# Patient Record
Sex: MALE | Race: WHITE | Employment: FULL TIME | URBAN - METROPOLITAN AREA
[De-identification: names, ages, dates, MRNs, and addresses within clinical notes are randomized per-mention and may not be internally consistent; named-entity substitution may affect disease eponyms.]

---

## 2020-03-06 ENCOUNTER — OFFICE VISIT (OUTPATIENT)
Dept: FAMILY MEDICINE | Facility: CLINIC | Age: 50
End: 2020-03-06
Payer: COMMERCIAL

## 2020-03-06 VITALS
SYSTOLIC BLOOD PRESSURE: 134 MMHG | OXYGEN SATURATION: 97 % | WEIGHT: 216.4 LBS | HEIGHT: 68 IN | DIASTOLIC BLOOD PRESSURE: 78 MMHG | TEMPERATURE: 97.9 F | HEART RATE: 91 BPM | RESPIRATION RATE: 18 BRPM | BODY MASS INDEX: 32.8 KG/M2

## 2020-03-06 DIAGNOSIS — E78.5 HYPERLIPIDEMIA LDL GOAL <130: ICD-10-CM

## 2020-03-06 DIAGNOSIS — J30.2 SEASONAL ALLERGIES: Primary | ICD-10-CM

## 2020-03-06 DIAGNOSIS — J30.9 ALLERGIC RHINITIS, UNSPECIFIED SEASONALITY, UNSPECIFIED TRIGGER: ICD-10-CM

## 2020-03-06 LAB
ANION GAP SERPL CALCULATED.3IONS-SCNC: 9 MMOL/L (ref 3–14)
BUN SERPL-MCNC: 14 MG/DL (ref 7–30)
CALCIUM SERPL-MCNC: 9.4 MG/DL (ref 8.5–10.1)
CHLORIDE SERPL-SCNC: 105 MMOL/L (ref 94–109)
CHOLEST SERPL-MCNC: 248 MG/DL
CO2 SERPL-SCNC: 27 MMOL/L (ref 20–32)
CREAT SERPL-MCNC: 0.89 MG/DL (ref 0.66–1.25)
GFR SERPL CREATININE-BSD FRML MDRD: >90 ML/MIN/{1.73_M2}
GLUCOSE SERPL-MCNC: 122 MG/DL (ref 70–99)
HDLC SERPL-MCNC: 29 MG/DL
LDLC SERPL CALC-MCNC: ABNORMAL MG/DL
NONHDLC SERPL-MCNC: 219 MG/DL
POTASSIUM SERPL-SCNC: 4.4 MMOL/L (ref 3.4–5.3)
SODIUM SERPL-SCNC: 141 MMOL/L (ref 133–144)
TRIGL SERPL-MCNC: 512 MG/DL

## 2020-03-06 PROCEDURE — 80061 LIPID PANEL: CPT | Performed by: FAMILY MEDICINE

## 2020-03-06 PROCEDURE — 36415 COLL VENOUS BLD VENIPUNCTURE: CPT | Performed by: FAMILY MEDICINE

## 2020-03-06 PROCEDURE — 80048 BASIC METABOLIC PNL TOTAL CA: CPT | Performed by: FAMILY MEDICINE

## 2020-03-06 PROCEDURE — 99203 OFFICE O/P NEW LOW 30 MIN: CPT | Performed by: FAMILY MEDICINE

## 2020-03-06 ASSESSMENT — PAIN SCALES - GENERAL: PAINLEVEL: NO PAIN (0)

## 2020-03-06 ASSESSMENT — MIFFLIN-ST. JEOR: SCORE: 1826.08

## 2020-03-06 NOTE — LETTER
March 11, 2020      Mian Pina  [address] [place] [address]        Dear ,    We are writing to inform you of your test results.    Your triglyceride  levels are very high the recommended nonpharmacologic interventions such as weight loss in obese patients, aerobic exercise, avoidance of concentrated sugars and medications that raise serum triglyceride levels, and strict glycemic control is the first-line therapy.. Alcohol use must be avoided with severe hypertriglyceridemia as it can cause large increases in triglyceride levels precipitate pancreatitis. We can do a medication the lowers the triglycerides.   The total cholesterol level is also above desirable cholesterol levels; the 10-year ASCVD risk score (Billingsleyolya SORENSEN Jr., et al., 2013) is: 8.8%  (that is your risk of heart disease in the next 10 years; interpretation: < 5% is low risk, 5% - < 7.5% borderline risk, >7.5% - < 20% intermediate risk and > 20% high risk and the risk estimates need for cholesterol medication). The recommended interventions include healthy diet, regular exercise, maintaining an ideal weight, consider a cholesterol lowering pill and rechecking in 3-6 months. Your blood sugar is in the prediabetes range (100 - 125) and the same interventions will help.     Let me know your thoughts or schedule appointment in 1 month to discuss        Resulted Orders   Lipid Profile (Chol, Trig, HDL, LDL calc)   Result Value Ref Range    Cholesterol 248 (H) <200 mg/dL      Comment:      Desirable:       <200 mg/dl    Triglycerides 512 (H) <150 mg/dL      Comment:      Borderline high:  150-199 mg/dl  High:             200-499 mg/dl  Very high:       >499 mg/dl  Fasting specimen      HDL Cholesterol 29 (L) >39 mg/dL    LDL Cholesterol Calculated  <100 mg/dL     Cannot estimate LDL when triglyceride exceeds 400 mg/dL    Non HDL Cholesterol 219 (H) <130 mg/dL      Comment:      Above Desirable:  130-159 mg/dl  Borderline high:  160-189  mg/dl  High:             190-219 mg/dl  Very high:       >219 mg/dl     Basic metabolic panel  (Ca, Cl, CO2, Creat, Gluc, K, Na, BUN)   Result Value Ref Range    Sodium 141 133 - 144 mmol/L    Potassium 4.4 3.4 - 5.3 mmol/L    Chloride 105 94 - 109 mmol/L    Carbon Dioxide 27 20 - 32 mmol/L    Anion Gap 9 3 - 14 mmol/L    Glucose 122 (H) 70 - 99 mg/dL      Comment:      Fasting specimen    Urea Nitrogen 14 7 - 30 mg/dL    Creatinine 0.89 0.66 - 1.25 mg/dL    GFR Estimate >90 >60 mL/min/[1.73_m2]      Comment:      Non  GFR Calc  Starting 12/18/2018, serum creatinine based estimated GFR (eGFR) will be   calculated using the Chronic Kidney Disease Epidemiology Collaboration   (CKD-EPI) equation.      GFR Estimate If Black >90 >60 mL/min/[1.73_m2]      Comment:       GFR Calc  Starting 12/18/2018, serum creatinine based estimated GFR (eGFR) will be   calculated using the Chronic Kidney Disease Epidemiology Collaboration   (CKD-EPI) equation.      Calcium 9.4 8.5 - 10.1 mg/dL       If you have any questions or concerns, please call the clinic at the number listed above.       Sincerely,        Milo Brower MD/dieter

## 2020-03-06 NOTE — PROGRESS NOTES
"Subjective     Mian Pina is a 49 year old male who presents to clinic today for the following health issues:    HPI   Chief Complaint   Patient presents with     Establish Care     Hypertension     hasn't been diagnosed     Elevated BP concerns;  Had high readings twice when was sick; getting reading in clinic good today    Lingering sinus cold   Had influenza 2 weeks ago   Every spring gets this congestion and would like to see allergy. Associated with SOB, wheezing, low energy.  No personal or family history of asthma  Has taken decongestant in past and helped    Shots:   Never had flu shot    Reviewed and updated as needed this visit by Provider    Review of Systems   Constitutional, HEENT, cardiovascular, pulmonary, gi and gu systems are negative, except as otherwise noted.      Objective    /78   Pulse 91   Temp 97.9  F (36.6  C) (Oral)   Resp 18   Ht 1.727 m (5' 8\")   Wt 98.2 kg (216 lb 6.4 oz)   SpO2 97%   BMI 32.90 kg/m    Body mass index is 32.9 kg/m .  Physical Exam   GENERAL: healthy, alert and no distress  RESP: lungs clear to auscultation - no rales, rhonchi or wheezes  CV: regular rate and rhythm, normal S1 S2, no S3 or S4, no murmur, click or rub, no peripheral edema  MS: no gross musculoskeletal defects noted, no edema  NEURO: Normal strength and tone, mentation intact and speech normal    Diagnostic Test Results:  Results for orders placed or performed in visit on 03/06/20   Lipid Profile (Chol, Trig, HDL, LDL calc)     Status: Abnormal   Result Value Ref Range    Cholesterol 248 (H) <200 mg/dL    Triglycerides 512 (H) <150 mg/dL    HDL Cholesterol 29 (L) >39 mg/dL    LDL Cholesterol Calculated  <100 mg/dL     Cannot estimate LDL when triglyceride exceeds 400 mg/dL    Non HDL Cholesterol 219 (H) <130 mg/dL   Basic metabolic panel  (Ca, Cl, CO2, Creat, Gluc, K, Na, BUN)     Status: Abnormal   Result Value Ref Range    Sodium 141 133 - 144 mmol/L    Potassium 4.4 3.4 - 5.3 mmol/L    " Chloride 105 94 - 109 mmol/L    Carbon Dioxide 27 20 - 32 mmol/L    Anion Gap 9 3 - 14 mmol/L    Glucose 122 (H) 70 - 99 mg/dL    Urea Nitrogen 14 7 - 30 mg/dL    Creatinine 0.89 0.66 - 1.25 mg/dL    GFR Estimate >90 >60 mL/min/[1.73_m2]    GFR Estimate If Black >90 >60 mL/min/[1.73_m2]    Calcium 9.4 8.5 - 10.1 mg/dL     Assessment & Plan     Mian was seen today for establish care and hypertension.    Diagnoses and all orders for this visit:    Seasonal allergies  -     ALLERGY/ASTHMA ADULT REFERRAL    Allergic rhinitis, unspecified seasonality, unspecified trigger  -     ALLERGY/ASTHMA ADULT REFERRAL    Hyperlipidemia LDL goal <130     Shows hypertriglyceridemia; recommended nonpharmacologic interventions such as weight loss in obese patients, aerobic exercise, avoidance of concentrated sugars and medications that raise serum triglyceride levels, and strict glycemic control is the first-line therapy.. Alcohol use must be avoided with severe hypertriglyceridemia as it can cause large increases in triglyceride levels precipitate pancreatitis. We can do a medication the lowers the triglycerides, to decide.   -     Lipid Profile (Chol, Trig, HDL, LDL calc)  -     Basic metabolic panel  (Ca, Cl, CO2, Creat, Gluc, K, Na, BUN)    Return in about 3 months (around 6/6/2020) for Physical Exam.    Milo Brower MD  [facility]

## 2020-03-10 ENCOUNTER — OFFICE VISIT (OUTPATIENT)
Dept: ALLERGY | Facility: CLINIC | Age: 50
End: 2020-03-10
Attending: FAMILY MEDICINE
Payer: COMMERCIAL

## 2020-03-10 VITALS
HEART RATE: 104 BPM | WEIGHT: 217.4 LBS | SYSTOLIC BLOOD PRESSURE: 145 MMHG | OXYGEN SATURATION: 95 % | DIASTOLIC BLOOD PRESSURE: 94 MMHG | BODY MASS INDEX: 33.06 KG/M2

## 2020-03-10 DIAGNOSIS — J30.81 ALLERGIC RHINITIS DUE TO ANIMALS: ICD-10-CM

## 2020-03-10 DIAGNOSIS — J30.89 ALLERGIC RHINITIS DUE TO DUST MITE: ICD-10-CM

## 2020-03-10 DIAGNOSIS — R06.02 SOB (SHORTNESS OF BREATH): Primary | ICD-10-CM

## 2020-03-10 PROBLEM — E78.1 HYPERTRIGLYCERIDEMIA: Status: ACTIVE | Noted: 2020-03-10

## 2020-03-10 LAB
FEF 25/75: NORMAL
FEV-1: NORMAL
FEV1/FVC: NORMAL
FVC: NORMAL

## 2020-03-10 PROCEDURE — 94010 BREATHING CAPACITY TEST: CPT | Performed by: ALLERGY & IMMUNOLOGY

## 2020-03-10 PROCEDURE — 99243 OFF/OP CNSLTJ NEW/EST LOW 30: CPT | Mod: 25 | Performed by: ALLERGY & IMMUNOLOGY

## 2020-03-10 PROCEDURE — 95004 PERQ TESTS W/ALRGNC XTRCS: CPT | Performed by: ALLERGY & IMMUNOLOGY

## 2020-03-10 NOTE — PATIENT INSTRUCTIONS
If you have any questions regarding your allergies, asthma, or what we discussed during your visit today please call the allergy clinic or contact us via [employer].    [facility] Allergy RN Line: [phone]  [facility] Scheduling Line: [phone]  [facility] Scheduling Line: [phone]      ENVIRONMENTAL PERCUTANEOUS SKIN TESTING: ADULT  [facility] 3/10/2020   Consent Y   Ordering Physician  Dr. Bajwa   Interpreting Physician Dr. Bajwa   Testing Technician Karuna ZUÑIGA RN   Location Back   Time start:  2:40 PM   Time End:  2:55 PM   Positive Control: Histatrol*ALK 1 mg/ml 6/24   Negative Control: 50% Glycerin 0   Cat Hair*ALK (10,000 BAU/ml) 0   AP Dog Hair/Dander (1:100 w/v) 4/17   Dust Mite p. 30,000 AU/ml 11/32   Dust Mite f. (30,000 AU/ml) 15/30   Davie (W/F in millimeters) 0   Cj Grass (100,000 BAU/mL) 0   Red Cedar (W/F in millimeters) 0   Maple/[place] (W/F in millimeters) 0   Hackberry (W/F in millimeters) 0   [place] (W/F in millimeters) 0   [place] *ALK (W/F in millimeters) 0   American Elm (W/F in millimeters) 0   [place] (W/F in millimeters) 0   Black [place] (W/F in millimeters) 0   Birch Mix (W/F in millimeters) 0   [place] (W/F in millimeters) 0   Oak (W/F in millimeters) 0   Cocklebur (W/F in millimeters) 0   [place] (W/F in millimeters) 0   White Lloyd (W/F in millimeters) 0   Careless (W/F in millimeters) 0   Nettle (W/F in millimeters) 0   English Plantain (W/F in millimeters) 0   Kochia (W/F in millimeters) 0   Lamb's Quarter (W/F in millimeters) 0   Marshelder (W/F in millimeters) 0   Ragweed Mix* ALK (W/F in millimeters) 0   Russian Thistle (W/F in millimeters) 0   Sagebrush/Mugwort (W/F in millimeters) 0   Sheep Sorrel (W/F in millimeters) 0   Feather Mix* ALK (W/F in millimeters) 0   Penicillium Mix (1:10 w/v) 0   Curvularia spicifera (1:10 w/v) 0   Epicoccum (1:10 w/v) 0   Aspergillus fumigatus (1:10 w/v): 0   Alternaria tenius (1:10  w/v) 0   H. Cladosporium (1:10 w/v) 0   Phoma herbarum (1:10 w/v) 0        Patient Education     Controlling Allergens: Dust Mites     Wash all bedding in hot water.     Constant exposure to allergens means constant allergy symptoms. That s why controlling or avoiding the allergens that cause your symptoms is an important part of your treatment. If you are allergic to dust mites, the tips below can help to lessen your exposure to dust mites.  Dust mite allergy  Dust mites are a common cause of nasal allergies. These mites are tiny organisms that live in bedding, upholstered furniture, and carpet. They live in warm, humid conditions. House-dust mites are almost impossible to get rid of. But you can keep them under control.  Make changes to your home  Some furniture, like sofas and chairs, hold dust mites. To lessen the problem:    Choose nonfabric upholstery, like leather or vinyl.    Replace horizontal blinds with pull-down shades or vertical blinds.    Use washable curtains instead of heavy drapes.    Have as little carpeting as possible.    Cover your mattress, box spring, and pillows in allergy-proof casings.  Housecleaning  Here are some tips:    Wash sheets, blankets, and mattress pads every 1 to 2 weeks in hot water (at least 130 F).    Remove stuffed animals and other things that collect dust, such as wall hangings, knickknacks, and books--especially in the bedroom.    Dust your home every week with a damp cloth. Vacuum once a week. Use HEPA (high efficiency particulate air) filters or double-ply bags in the vacuum . Or, use a vacuum designed to lessen allergens.    If someone else can t dust and vacuum for you, wearing a filter mask may help.  Reduce indoor humidity  Dust mites need moist air to live. Use a dehumidifier to reduce air moisture. Don t use humidifiers, or vaporizers.  Talk with your healthcare provider about other ways to reduce dust in your home. Ask about medicines that can help with  your allergy symptoms.  Date Last Reviewed: 9/1/2016 2000-2019 [employer]. [address] [place] [address]. All rights reserved. This information is not intended as a substitute for professional medical care. Always follow your healthcare professional's instructions.           Patient Education     Controlling Allergens: Dust Mites in the Bedroom    Many people with asthma are allergic to dust mites. Dust mites are tiny bugs that live in warm, damp places. They are too small to see. But they live in mattresses, pillows, upholstered furniture, and house dust. Dust mite allergy can cause asthma flare-ups. If you have this allergy, there are many steps you can take to control dust mites at home.  Take these steps to control dust mites in your bed and bedroom:  1. Keep all clothing in a closet, with the door shut.  2. Make sure the room is not too humid. It should be below 50% humidity.  3. Choose wood, leather, or vinyl for furniture instead of upholstery.  4. Wash all bedding, pillows, and stuffed toys in hot water (130 F) every week. Dry them in a hot dryer. Remove any items that can't be washed.  5. Use special covers on pillows, mattresses, and box springs. These covers are made for people with allergies.  6. If you can, replace carpeting with tile or hardwood mary. Use washable throw rugs. Or don't use rugs at all.  7. Dust furniture with a damp cloth at least once a week.  8. Use an air conditioner or a dehumidifier to reduce humidity and filter the air. Clean the filter often.  9. Use pull-down shades or vertical window blinds that can be easily cleaned. Use these instead of curtains or drapes.  10. Use filters over heater vents.  Date Last Reviewed: 10/1/2016    4096-0922 [employer]. [address] [place], [place] [id number]. All rights reserved. This information is not intended as a substitute for professional medical care. Always follow your healthcare  professional's instructions.

## 2020-03-10 NOTE — PROGRESS NOTES
Dear Milo Brower MD,    Thank you for referring your patient Mian Pina to the Allergy/Immunology Clinic. Mian Pina was seen in the Allergy Clinic at [facility]. The following are my recommendations regarding his Shortness of Breath, Allergic Rhinitis Due to Animals and Allergic Rhinitis Due to Dust Mites    1. Counseling provided regarding allergen avoidance measures to dust mite  2. Recommend second generation H1 antihistamine such as cetirizine, fexofenadine, or loratadine daily as needed  3. Consider addition of nasal steroid for persistent symptoms  4. Follow-up in 3 months      Mian Pina is a 49 year old White male being seen today at the request of Dr. Brower in consultation for allergies. He states that he has had allergies and sinus issues for many years. These symptoms are present throughout the year though they do worsen in the spring and summer months. He has never been tested or evaluated for symptoms. Mian reports symptoms of rhinorrhea, nasal congestion, and post-nasal drainage. These symptoms are constant throughout the day. He does not have significant ocular symptoms. He has occasionally taken antihistamines but does not typically take medications. He has not used a neti pot or sinus irrigation rinse.    Mian was diagnosed with influenza A 2 weeks ago. He regularly goes to the gym several times per week. He has had some wheezing and shortness of breath since this illness. He does feel that he is improving.      PAST MEDICAL HISTORY:  None    FAMILY HISTORY:  No known family history of allergies or asthma    History reviewed. No pertinent surgical history.    ENVIRONMENTAL HISTORY: [place] The home is heated with a forced air and gas furnace. They do have central air conditioning. The patient's bedroom is furnished with hard mary in bedroom and fabric window coverings.  Pets inside the house include 1  cat(s). There is no history of cockroach or mice infestation. There is/are 0 smokers in the house.  The house does not have a damp basement.     SOCIAL HISTORY:   Mian is employed as . He has missed 3 days of school/work due to influenza. He lives with his 2 children (half time).      REVIEW OF SYSTEMS:  General: negative for weight gain. negative for weight loss. negative for changes in sleep.   Eyes: negative for itching. negative for redness. negative for tearing/watering. negative for vision changes  Ears: negative for fullness. negative for hearing loss. negative for dizziness.   Nose: negative for snoring.negative for changes in smell. positive  for drainage.   Throat: negative for hoarseness. negative for sore throat. negative for trouble swallowing.   Lungs: positive  for cough. positive  for shortness of breath.negative for wheezing. positive  for sputum production.   Cardiovascular: negative for chest pain. negative for swelling of ankles. negative for fast or irregular heartbeat.   Gastrointestinal: negative for nausea. negative for heartburn. negative for acid reflux.   Musculoskeletal: negative for joint pain. negative for joint stiffness. negative for joint swelling.   Neurologic: negative for seizures. negative for fainting. negative for weakness.   Psychiatric: negative for changes in mood. negative for anxiety.   Endocrine: negative for cold intolerance. negative for heat intolerance. negative for tremors.   Hematologic: negative for easy bruising. negative for easy bleeding.  Integumentary: negative for rash. negative for scaling. negative for nail changes.     No current outpatient medications on file.    There is no immunization history on file for this patient.  No Known Allergies      EXAM:   BP (!) 145/94 (BP Location: Left arm, Patient Position: Sitting, Cuff Size: Adult Large)   Pulse 104   Wt 98.6 kg (217 lb 6.4 oz)   SpO2 95%   BMI 33.06 kg/m    GENERAL APPEARANCE:  alert, cooperative and not in distress  SKIN: no rashes, no lesions  HEAD: atraumatic, normocephalic  EYES: lids and lashes normal, conjunctivae and sclerae clear, pupils equal, round, reactive to light, EOM full and intact  ENT: no scars or lesions, nasal exam showed no discharge, swelling or lesions noted, otoscopy showed external auditory canals clear, tympanic membranes normal, tongue midline and normal, soft palate, uvula, and tonsils normal  NECK: no asymmetry, masses, or scars, supple without significant adenopathy  LUNGS: unlabored respirations, no intercostal retractions or accessory muscle use, clear to auscultation without rales or wheezes  HEART: regular rate and rhythm without murmurs and normal S1 and S2  MUSCULOSKELETAL: no musculoskeletal defects are noted  NEURO: no focal deficits noted  PSYCH: does not appear depressed or anxious    WORKUP: Skin testing, Spirometry  SPIROMETRY       FVC 4.41L (92% of predicted).     FEV1 3.65L (98% of predicted).     FEV1/FVC 83%      I have reviewed and interpreted these results. These values and flow volume loop are consistent with normal lung function.    ENVIRONMENTAL PERCUTANEOUS SKIN TESTING: ADULT  [facility] 3/10/2020   Consent Y   Ordering Physician  Dr. Bajwa   Interpreting Physician Dr. Bajwa   Testing Technician Karuna ZUÑIGA RN   Location Back   Time start:  2:40 PM   Time End:  2:55 PM   Positive Control: Histatrol*ALK 1 mg/ml 6/24   Negative Control: 50% Glycerin 0   Cat Hair*ALK (10,000 BAU/ml) 0   AP Dog Hair/Dander (1:100 w/v) 4/17   Dust Mite p. 30,000 AU/ml 11/32   Dust Mite f. (30,000 AU/ml) 15/30   Davie (W/F in millimeters) 0   Cj Grass (100,000 BAU/mL) 0   Red Cedar (W/F in millimeters) 0   Maple/[place] (W/F in millimeters) 0   Hackberry (W/F in millimeters) 0   [place] (W/F in millimeters) 0   [place] *ALK (W/F in millimeters) 0   American Elm (W/F in millimeters) 0   [place] (W/F in millimeters) 0   Black [place]  (W/F in millimeters) 0   Birch Mix (W/F in millimeters) 0   [place] (W/F in millimeters) 0   Oak (W/F in millimeters) 0   Cockle[place] (W/F in millimeters) 0   [place] (W/F in millimeters) 0   White [place] (W/F in millimeters) 0   Careless (W/F in millimeters) 0   Nettle (W/F in millimeters) 0   English Plantain (W/F in millimeters) 0   Kochia (W/F in millimeters) 0   Lamb's Quarter (W/F in millimeters) 0   Marshelder (W/F in millimeters) 0   Ragweed Mix* ALK (W/F in millimeters) 0   Russian Thistle (W/F in millimeters) 0   Sagebrush/Mugwort (W/F in millimeters) 0   Sheep Sorrel (W/F in millimeters) 0   Feather Mix* ALK (W/F in millimeters) 0   Penicillium Mix (1:10 w/v) 0   Curvularia spicifera (1:10 w/v) 0   Epicoccum (1:10 w/v) 0   Aspergillus fumigatus (1:10 w/v): 0   Alternaria tenius (1:10 w/v) 0   H. Cladosporium (1:10 w/v) 0   Phoma herbarum (1:10 w/v) 0      Appropriate response to controls, positive to dog and dust mite    ASSESSMENT/PLAN:  Mian Pina is a 49 year old male here for evaluation of allergies. Skin prick testing was positive for sensitization to dog and dust mite. He was counseled regarding allergen avoidance measures, medications, and immunotherapy treatment to manage allergic rhinitis. He reported symptoms of shortness of breath and wheezing that he attributes to recent infection with influenza A. Thee symptoms are improving and spirometry is normal.    1. Counseling provided regarding allergen avoidance measures to dust mite  2. Recommend second generation H1 antihistamine such as cetirizine, fexofenadine, or loratadine daily as needed  3. Consider addition of nasal steroid for persistent symptoms  4. Follow-up in 3 months      Thank you for allowing me to participate in the care of Mian Pina.      Letty Bajwa MD  Allergy/Immunology  [facility]      Chart documentation done in part with Dragon Voice Recognition Software. Although reviewed after completion,  some word and grammatical errors may remain.

## 2020-03-10 NOTE — NURSING NOTE
Per provider verbal order, placed Adult Environmental Panel scratch test.  Consent was obtained prior to procedure.  Once panels were placed, patient was monitored for 15 minutes in clinic.  Provider read test after 15 minutes..  Pt tolerated procedure well.  All questions and concerns were addressed at office visit.       Karuna Lu RN

## 2020-03-10 NOTE — LETTER
3/10/2020         RE: Mian Pina  [address] [place] [address]        Dear Colleague,    Thank you for referring your patient, Mian Pina, to the [facility]. Please see a copy of my visit note below.    Dear Milo Brower MD,    Thank you for referring your patient Mian Pina to the Allergy/Immunology Clinic. Mian Pina was seen in the Allergy Clinic at [facility]. The following are my recommendations regarding his Shortness of Breath, Allergic Rhinitis Due to Animals and Allergic Rhinitis Due to Dust Mites    1. Counseling provided regarding allergen avoidance measures to dust mite  2. Recommend second generation H1 antihistamine such as cetirizine, fexofenadine, or loratadine daily as needed  3. Consider addition of nasal steroid for persistent symptoms  4. Follow-up in 3 months      Mian Pina is a 49 year old White male being seen today at the request of Dr. Brower in consultation for allergies. He states that he has had allergies and sinus issues for many years. These symptoms are present throughout the year though they do worsen in the spring and summer months. He has never been tested or evaluated for symptoms. Mian reports symptoms of rhinorrhea, nasal congestion, and post-nasal drainage. These symptoms are constant throughout the day. He does not have significant ocular symptoms. He has occasionally taken antihistamines but does not typically take medications. He has not used a neti pot or sinus irrigation rinse.    Mian was diagnosed with influenza A 2 weeks ago. He regularly goes to the gym several times per week. He has had some wheezing and shortness of breath since this illness. He does feel that he is improving.      PAST MEDICAL HISTORY:  None    FAMILY HISTORY:  No known family history of allergies or asthma    History reviewed. No pertinent surgical history.    ENVIRONMENTAL HISTORY: [place] The home is heated with a forced air and gas furnace. They do have central air conditioning. The patient's bedroom is furnished with hard mary in bedroom and fabric window coverings.  Pets inside the house include 1 cat(s). There is no history of cockroach or mice infestation. There is/are 0 smokers in the house.  The house does not have a damp basement.     SOCIAL HISTORY:   Mian is employed as . He has missed 3 days of school/work due to influenza. He lives with his 2 children (half time).      REVIEW OF SYSTEMS:  General: negative for weight gain. negative for weight loss. negative for changes in sleep.   Eyes: negative for itching. negative for redness. negative for tearing/watering. negative for vision changes  Ears: negative for fullness. negative for hearing loss. negative for dizziness.   Nose: negative for snoring.negative for changes in smell. positive  for drainage.   Throat: negative for hoarseness. negative for sore throat. negative for trouble swallowing.   Lungs: positive  for cough. positive  for shortness of breath.negative for wheezing. positive  for sputum production.   Cardiovascular: negative for chest pain. negative for swelling of ankles. negative for fast or irregular heartbeat.   Gastrointestinal: negative for nausea. negative for heartburn. negative for acid reflux.   Musculoskeletal: negative for joint pain. negative for joint stiffness. negative for joint swelling.   Neurologic: negative for seizures. negative for fainting. negative for weakness.   Psychiatric: negative for changes in mood. negative for anxiety.   Endocrine: negative for cold intolerance. negative for heat intolerance. negative for tremors.   Hematologic: negative for easy bruising. negative for easy bleeding.  Integumentary: negative for rash. negative for scaling. negative for nail changes.     No current outpatient medications on file.    There is no immunization history on file  for this patient.  No Known Allergies      EXAM:   BP (!) 145/94 (BP Location: Left arm, Patient Position: Sitting, Cuff Size: Adult Large)   Pulse 104   Wt 98.6 kg (217 lb 6.4 oz)   SpO2 95%   BMI 33.06 kg/m    GENERAL APPEARANCE: alert, cooperative and not in distress  SKIN: no rashes, no lesions  HEAD: atraumatic, normocephalic  EYES: lids and lashes normal, conjunctivae and sclerae clear, pupils equal, round, reactive to light, EOM full and intact  ENT: no scars or lesions, nasal exam showed no discharge, swelling or lesions noted, otoscopy showed external auditory canals clear, tympanic membranes normal, tongue midline and normal, soft palate, uvula, and tonsils normal  NECK: no asymmetry, masses, or scars, supple without significant adenopathy  LUNGS: unlabored respirations, no intercostal retractions or accessory muscle use, clear to auscultation without rales or wheezes  HEART: regular rate and rhythm without murmurs and normal S1 and S2  MUSCULOSKELETAL: no musculoskeletal defects are noted  NEURO: no focal deficits noted  PSYCH: does not appear depressed or anxious    WORKUP: Skin testing, Spirometry  SPIROMETRY       FVC 4.41L (92% of predicted).     FEV1 3.65L (98% of predicted).     FEV1/FVC 83%      I have reviewed and interpreted these results. These values and flow volume loop are consistent with normal lung function.    ENVIRONMENTAL PERCUTANEOUS SKIN TESTING: ADULT  [facility] 3/10/2020   Consent Y   Ordering Physician  Dr. Bajwa   Interpreting Physician Dr. Bajwa   Testing Technician Karuna ZUÑIGA RN   Location Back   Time start:  2:40 PM   Time End:  2:55 PM   Positive Control: Histatrol*ALK 1 mg/ml 6/24   Negative Control: 50% Glycerin 0   Cat Hair*ALK (10,000 BAU/ml) 0   AP Dog Hair/Dander (1:100 w/v) 4/17   Dust Mite p. 30,000 AU/ml 11/32   Dust Mite f. (30,000 AU/ml) 15/30   Davie (W/F in millimeters) 0   Cj Grass (100,000 BAU/mL) 0   Red Cedar (W/F in millimeters) 0    Maple/Burlington (W/F in millimeters) 0   Hackberry (W/F in millimeters) 0   Flagler (W/F in millimeters) 0   Arapahoe *ALK (W/F in millimeters) 0   American Elm (W/F in millimeters) 0   [place]ck (W/F in millimeters) 0   Black [place] (W/F in millimeters) 0   Birch Mix (W/F in millimeters) 0   Loman (W/F in millimeters) 0   Oak (W/F in millimeters) 0   Cocklebur (W/F in millimeters) 0   Bartow (W/F in millimeters) 0   White Lloyd (W/F in millimeters) 0   Careless (W/F in millimeters) 0   Nettle (W/F in millimeters) 0   English Plantain (W/F in millimeters) 0   Kochia (W/F in millimeters) 0   Lamb's Quarter (W/F in millimeters) 0   Marshelder (W/F in millimeters) 0   Ragweed Mix* ALK (W/F in millimeters) 0   Russian Thistle (W/F in millimeters) 0   Sagebrush/Mugwort (W/F in millimeters) 0   Sheep Sorrel (W/F in millimeters) 0   Feather Mix* ALK (W/F in millimeters) 0   Penicillium Mix (1:10 w/v) 0   Curvularia spicifera (1:10 w/v) 0   Epicoccum (1:10 w/v) 0   Aspergillus fumigatus (1:10 w/v): 0   Alternaria tenius (1:10 w/v) 0   H. Cladosporium (1:10 w/v) 0   Phoma herbarum (1:10 w/v) 0      Appropriate response to controls, positive to dog and dust mite    ASSESSMENT/PLAN:  Mian Pina is a 49 year old male here for evaluation of allergies. Skin prick testing was positive for sensitization to dog and dust mite. He was counseled regarding allergen avoidance measures, medications, and immunotherapy treatment to manage allergic rhinitis. He reported symptoms of shortness of breath and wheezing that he attributes to recent infection with influenza A. Thee symptoms are improving and spirometry is normal.    1. Counseling provided regarding allergen avoidance measures to dust mite  2. Recommend second generation H1 antihistamine such as cetirizine, fexofenadine, or loratadine daily as needed  3. Consider addition of nasal steroid for persistent symptoms  4. Follow-up in 3 months      Thank you for allowing me to  participate in the care of Mian Pina.      Letty Bajwa MD  Allergy/Immunology  [facility]      Chart documentation done in part with Dragon Voice Recognition Software. Although reviewed after completion, some word and grammatical errors may remain.    Again, thank you for allowing me to participate in the care of your patient.        Sincerely,        Letty Bajwa MD

## 2020-10-14 ENCOUNTER — CARE COORDINATION (OUTPATIENT)
Dept: CARDIOLOGY | Facility: CLINIC | Age: 50
End: 2020-10-14

## 2020-10-14 NOTE — PROGRESS NOTES
Referral to see Dr. Stark in his interventional clinic.  Pt was called and a message was left for him, including the telephone number.

## 2020-11-06 ENCOUNTER — TELEPHONE (OUTPATIENT)
Dept: CARDIOLOGY | Facility: CLINIC | Age: 50
End: 2020-11-06

## 2020-11-12 ENCOUNTER — OFFICE VISIT (OUTPATIENT)
Dept: CARDIOLOGY | Facility: CLINIC | Age: 50
End: 2020-11-12
Attending: INTERNAL MEDICINE
Payer: COMMERCIAL

## 2020-11-12 VITALS
BODY MASS INDEX: 33.03 KG/M2 | DIASTOLIC BLOOD PRESSURE: 96 MMHG | SYSTOLIC BLOOD PRESSURE: 159 MMHG | HEART RATE: 90 BPM | OXYGEN SATURATION: 97 % | WEIGHT: 223 LBS | HEIGHT: 69 IN

## 2020-11-12 DIAGNOSIS — I10 BENIGN ESSENTIAL HYPERTENSION: Primary | ICD-10-CM

## 2020-11-12 DIAGNOSIS — E78.5 DYSLIPIDEMIA: ICD-10-CM

## 2020-11-12 DIAGNOSIS — E78.1 HYPERTRIGLYCERIDEMIA: ICD-10-CM

## 2020-11-12 DIAGNOSIS — R73.03 PREDIABETES: ICD-10-CM

## 2020-11-12 DIAGNOSIS — Z82.49 FAMILY HISTORY OF ISCHEMIC HEART DISEASE: ICD-10-CM

## 2020-11-12 DIAGNOSIS — R06.09 EXERTIONAL DYSPNEA: ICD-10-CM

## 2020-11-12 LAB — INTERPRETATION ECG - MUSE: NORMAL

## 2020-11-12 PROCEDURE — G0463 HOSPITAL OUTPT CLINIC VISIT: HCPCS | Mod: 25

## 2020-11-12 PROCEDURE — 93005 ELECTROCARDIOGRAM TRACING: CPT

## 2020-11-12 PROCEDURE — 99205 OFFICE O/P NEW HI 60 MIN: CPT

## 2020-11-12 RX ORDER — ATORVASTATIN CALCIUM 40 MG/1
40 TABLET, FILM COATED ORAL DAILY
Qty: 90 TABLET | Refills: 3 | Status: SHIPPED | OUTPATIENT
Start: 2020-11-12

## 2020-11-12 RX ORDER — LISINOPRIL 10 MG/1
10 TABLET ORAL DAILY
Qty: 90 TABLET | Refills: 3 | Status: SHIPPED | OUTPATIENT
Start: 2020-11-12

## 2020-11-12 ASSESSMENT — PAIN SCALES - GENERAL: PAINLEVEL: NO PAIN (0)

## 2020-11-12 ASSESSMENT — MIFFLIN-ST. JEOR: SCORE: 1866.9

## 2020-11-12 NOTE — LETTER
11/12/2020      RE: Mian Pina  [address] [place] [address]       Dear Colleague,    Thank you for the opportunity to participate in the care of your patient, Mian Pina, at the [facility] [place] [facility]. Please see a copy of my visit note below.    Cardiology Clinic Note  November 12, 2020      HPI:  Mian Pina is a 50 year old who presents to establish preventive cardiology care. Last spring he presented to his PCP for routine check-up and was diagnosed with dyslipidemia, hypertension and prediabetes. Lifestyle modifications were recommended at that time. He has no known history of coronary artery disease. He reports a family history of heart disease on his father's side, but is unsure of the details. He is a nonsmoker.      Patient tells me that he feels well overall. 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 He is on his feet most of the day. He walks on the treadmill for 10-15 minutes day and occasionally jogs. He feels like he gets short of breath easier than he used to but thinks this may be related to his weight and being out of shape. He denies any exertional chest pain or pressure. He occasionally feels an odd tingling sensation in his chest and arms when his blood pressure is high - he says he wouldn't describe it as pain. This has been happening more frequently due to the stress of selling his house, raising kids, and he is concerned about his health. He admits that he drinks more than he should - two glasses of wine a night and eats poorly. He would like to cut down on alcohol intake and is interested in meeting with a dietician. He otherwise denies orthopnea, PND, leg swelling, palpitations, lightheadedness or syncope.     Past medical history:  HTN  HLD  Prediabetes  Family history of CAD    Medications:  No medications    Allergies:    No Known Allergies    Family and social history:    Family history of  heart disease on father's side (unsure of details)  Mom parkinsons    Social History     Socioeconomic History     Marital status: Single     Spouse name: Not on file     Number of children: Not on file     Years of education: Not on file     Highest education level: Not on file   Occupational History     Not on file   Social Needs     Financial resource strain: Not on file     Food insecurity     Worry: Not on file     Inability: Not on file     Transportation needs     Medical: Not on file     Non-medical: Not on file   Tobacco Use     Smoking status: Never Smoker     Smokeless tobacco: Never Used   Substance and Sexual Activity     Alcohol use: Yes     Drug use: Never     Sexual activity: Not Currently   Lifestyle     Physical activity     Days per week: Not on file     Minutes per session: Not on file     Stress: Not on file   Relationships     Social connections     Talks on phone: Not on file     Gets together: Not on file     Attends Cheondoism service: Not on file     Active member of club or organization: Not on file     Attends meetings of clubs or organizations: Not on file     Relationship status: Not on file     Intimate partner violence     Fear of current or ex partner: Not on file     Emotionally abused: Not on file     Physically abused: Not on file     Forced sexual activity: Not on file   Other Topics Concern     Not on file   Social History Narrative     Not on file     Review of Systems:  Skin: No skin rash or ulcers.  Respiratory: No cough or hemoptysis.  Cardiovascular: See HPI.    Gastrointestinal: No abdominal pain, nausea, vomiting, hematemesis or melena.  Genitourinary: No increased frequency or urgency of urine. No dysuria or hematuria.  Musculoskeletal: No polyarthralgia or myalgias.  Neurologic: No headaches, seizure or focal weakness.  Hematologic/Lymphatic/Immunologic: No bleeding tendency.    Vital signs:  BP (!) 159/96 (BP Location: Right arm, Patient Position: Sitting, Cuff Size:  "Adult Regular)   Pulse 90   Ht 1.753 m (5' 9\")   Wt 101.2 kg (223 lb)   SpO2 97%   BMI 32.93 kg/m     Wt Readings from Last 2 Encounters:   11/12/20 101.2 kg (223 lb)   03/10/20 98.6 kg (217 lb 6.4 oz)     Physical Exam:  Gen: NAD.    HEENT: No conjunctival pallor or scleral icterus, MMM. Clear oropharynx.    Neck: No JVD. No thyroid enlargement or cervical adenopathy.    Chest: Clear to auscultation bilaterally.    CV: Normal first and second heart sounds. No murmurs or gallop appreciated.    Abdomen: Soft, non-tender, non-distended, BS+.  Ext: No edema. Warm and well perfused with normal capillary refill.    Skin: No skin rash or ulcers.  Neuro: alert, oriented and appropriately conversant.    Psych: Normal affect and speech.    Labs:  Last Basic Metabolic Panel:  Lab Results   Component Value Date     03/06/2020      Lab Results   Component Value Date    POTASSIUM 4.4 03/06/2020     Lab Results   Component Value Date    CHLORIDE 105 03/06/2020     Lab Results   Component Value Date    ALESSANDRO 9.4 03/06/2020     Lab Results   Component Value Date    CO2 27 03/06/2020     Lab Results   Component Value Date    BUN 14 03/06/2020     Lab Results   Component Value Date    CR 0.89 03/06/2020     Lab Results   Component Value Date     03/06/2020       Recent Labs   Lab Test 03/06/20  1015   CHOL 248*   HDL 29*   LDL Cannot estimate LDL when triglyceride exceeds 400 mg/dL   TRIG 512*     Diagnostics:    EKG today: NSR HR 94, incomplete RBBB, LVH    Assessment and Plan:  Mian Pina is a 50 year old with family history of heart disease and recently diagnosed dyslipidemia, hypertension and prediabetes who presents to establish preventive cardiology care.     1. Hyperlipidemia and hypertriglyceridemia: Recent lipid profile shows total cholesterol 248 and triglycerides > 500, unable to calculate LDL. Plan to obtain direct LDL. Start lipitor 40 mg daily with repeat lipids and hepatic panel in 3 months. If " trigylcerides remain elevated at that time will start lovaza. Dicussed importance of heart healthy diet, daily exercise and reducing alcohol intake.     2. Hypertension: Hypertensive today (159/96) and at a clinic visit in March 2020. Recommend starting lisinopril 10 mg daily with repeat labs in 1 week. Advised patient to start checking home blood pressures and call if consistently > 130/80.    3. Prediabetes: Fasting  in March 2020. Obtain HgbA1C with labs next week, if elevated will refer to endocrinology. Placed dietician referral.     4. ASCVD risk score: 10 year Afton risk score is 10%. Start ASA 81 mg daily. Statin as above. Plan for coronary CTA to evaluate for coronary artery disease.    5. Abnormal ECG: Noted to have LVH and incomplete RBBB on ECG today. Plan for resting echocardiogram.    Follow-up: RTC in 3 months w/labs prior to visit.        Please do not hesitate to contact me if you have any questions/concerns.     Sincerely,     [facility]

## 2020-11-12 NOTE — NURSING NOTE
Chief Complaint   Patient presents with     New Patient     Family hx Cardiac Disease        Vitals were taken and medications were reconciled.     Juliet Jordan  12:42 PM

## 2020-11-12 NOTE — PATIENT INSTRUCTIONS
You were seen today in the Cardiovascular Clinic at the [facility].    Cardiology provider you saw during your visit Alexia Mccartney NP and Dr. Stark.      1. Start Aspirin 81 mg daily, atorvastatin 40 mg and lisinopril 10 mg daily.  2. Have fasting labs drawn in 1 week - BMP, HgbA1C and direct LDL.  3. Schedule Echocardiogram and Coronary CTA at your convenience.  4. Meet with dietician - referral has been placed.  5. Start checking home blood pressures - call if consistently > 130/80.   6. Follow-up with me or Dr. Stark in 3 months with repeat fasting lipids prior to visit.     Questions and [phone].   First press #1 for the University and then press #3 for Medical Questions to reach the Cardiology triage nurse.     On Call Cardiologist for after hours or on weekends: [phone], press option #4 and ask to speak to the on-call Cardiologist.

## 2020-11-12 NOTE — PROGRESS NOTES
Cardiology Clinic Note  November 12, 2020      HPI:  Mian Pina is a 50 year old who presents to establish preventive cardiology care. Last spring he presented to his PCP for routine check-up and was diagnosed with dyslipidemia, hypertension and prediabetes. Lifestyle modifications were recommended at that time. He has no known history of coronary artery disease. He reports a family history of heart disease on his father's side, but is unsure of the details. He is a nonsmoker.      Patient tells me that he feels well overall. 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 He is on his feet most of the day. He walks on the treadmill for 10-15 minutes day and occasionally jogs. He feels like he gets short of breath easier than he used to but thinks this may be related to his weight and being out of shape. He denies any exertional chest pain or pressure. He occasionally feels an odd tingling sensation in his chest and arms when his blood pressure is high - he says he wouldn't describe it as pain. This has been happening more frequently due to the stress of selling his house, raising kids, and he is concerned about his health. He admits that he drinks more than he should - two glasses of wine a night and eats poorly. He would like to cut down on alcohol intake and is interested in meeting with a dietician. He otherwise denies orthopnea, PND, leg swelling, palpitations, lightheadedness or syncope.     Past medical history:  HTN  HLD  Prediabetes  Family history of CAD    Medications:  No medications    Allergies:    No Known Allergies    Family and social history:    Family history of heart disease on father's side (unsure of details)  Mom parkinsons    Social History     Socioeconomic History     Marital status: Single     Spouse name: Not on file     Number of children: Not on file     Years of education: Not on file     Highest education level: Not on file   Occupational History     Not on file   Social Needs      "Financial resource strain: Not on file     Food insecurity     Worry: Not on file     Inability: Not on file     Transportation needs     Medical: Not on file     Non-medical: Not on file   Tobacco Use     Smoking status: Never Smoker     Smokeless tobacco: Never Used   Substance and Sexual Activity     Alcohol use: Yes     Drug use: Never     Sexual activity: Not Currently   Lifestyle     Physical activity     Days per week: Not on file     Minutes per session: Not on file     Stress: Not on file   Relationships     Social connections     Talks on phone: Not on file     Gets together: Not on file     Attends Sabianist service: Not on file     Active member of club or organization: Not on file     Attends meetings of clubs or organizations: Not on file     Relationship status: Not on file     Intimate partner violence     Fear of current or ex partner: Not on file     Emotionally abused: Not on file     Physically abused: Not on file     Forced sexual activity: Not on file   Other Topics Concern     Not on file   Social History Narrative     Not on file     Review of Systems:  Skin: No skin rash or ulcers.  Respiratory: No cough or hemoptysis.  Cardiovascular: See HPI.    Gastrointestinal: No abdominal pain, nausea, vomiting, hematemesis or melena.  Genitourinary: No increased frequency or urgency of urine. No dysuria or hematuria.  Musculoskeletal: No polyarthralgia or myalgias.  Neurologic: No headaches, seizure or focal weakness.  Hematologic/Lymphatic/Immunologic: No bleeding tendency.    Vital signs:  BP (!) 159/96 (BP Location: Right arm, Patient Position: Sitting, Cuff Size: Adult Regular)   Pulse 90   Ht 1.753 m (5' 9\")   Wt 101.2 kg (223 lb)   SpO2 97%   BMI 32.93 kg/m     Wt Readings from Last 2 Encounters:   11/12/20 101.2 kg (223 lb)   03/10/20 98.6 kg (217 lb 6.4 oz)     Physical Exam:  Gen: NAD.    HEENT: No conjunctival pallor or scleral icterus, MMM. Clear oropharynx.    Neck: No JVD. No thyroid " enlargement or cervical adenopathy.    Chest: Clear to auscultation bilaterally.    CV: Normal first and second heart sounds. No murmurs or gallop appreciated.    Abdomen: Soft, non-tender, non-distended, BS+.  Ext: No edema. Warm and well perfused with normal capillary refill.    Skin: No skin rash or ulcers.  Neuro: alert, oriented and appropriately conversant.    Psych: Normal affect and speech.    Labs:  Last Basic Metabolic Panel:  Lab Results   Component Value Date     03/06/2020      Lab Results   Component Value Date    POTASSIUM 4.4 03/06/2020     Lab Results   Component Value Date    CHLORIDE 105 03/06/2020     Lab Results   Component Value Date    ALESSANDRO 9.4 03/06/2020     Lab Results   Component Value Date    CO2 27 03/06/2020     Lab Results   Component Value Date    BUN 14 03/06/2020     Lab Results   Component Value Date    CR 0.89 03/06/2020     Lab Results   Component Value Date     03/06/2020       Recent Labs   Lab Test 03/06/20  1015   CHOL 248*   HDL 29*   LDL Cannot estimate LDL when triglyceride exceeds 400 mg/dL   TRIG 512*     Diagnostics:    EKG today: NSR HR 94, incomplete RBBB, LVH    Assessment and Plan:  Mian Pina is a 50 year old with family history of heart disease and recently diagnosed dyslipidemia, hypertension and prediabetes who presents to establish preventive cardiology care.     1. Hyperlipidemia and hypertriglyceridemia: Recent lipid profile shows total cholesterol 248 and triglycerides > 500, unable to calculate LDL. Plan to obtain direct LDL. Start lipitor 40 mg daily with repeat lipids and hepatic panel in 3 months. If trigylcerides remain elevated at that time will start lovaza. Dicussed importance of heart healthy diet, daily exercise and reducing alcohol intake.     2. Hypertension: Hypertensive today (159/96) and at a clinic visit in March 2020. Recommend starting lisinopril 10 mg daily with repeat labs in 1 week. Advised patient to start checking home  blood pressures and call if consistently > 130/80.    3. Prediabetes: Fasting  in March 2020. Obtain HgbA1C with labs next week, if elevated will refer to endocrinology. Placed dietician referral.     4. ASCVD risk score: 10 year Burney risk score is 10%. Start ASA 81 mg daily. Statin as above. Plan for coronary CTA to evaluate for coronary artery disease.    5. Abnormal ECG: Noted to have LVH and incomplete RBBB on ECG today. Plan for resting echocardiogram.    Follow-up: RTC in 3 months w/labs prior to visit.    Physician Attestation   I, Alexsander Stark, saw and evaluated Mian Pina as part of a shared visit.  I have reviewed and discussed with the advanced practice provider their history, physical and plan.    I personally reviewed the vital signs, medications, labs and imaging.    My key history or physical exam findings & Key management decisions made by me:   Start Lisinopril and Lipitor      Alexsander Stark

## 2020-11-19 ENCOUNTER — VIRTUAL VISIT (OUTPATIENT)
Dept: NUTRITION | Facility: CLINIC | Age: 50
End: 2020-11-19
Payer: COMMERCIAL

## 2020-11-19 DIAGNOSIS — I10 BENIGN ESSENTIAL HYPERTENSION: ICD-10-CM

## 2020-11-19 DIAGNOSIS — R73.03 PREDIABETES: ICD-10-CM

## 2020-11-19 DIAGNOSIS — E78.5 DYSLIPIDEMIA: ICD-10-CM

## 2020-11-19 DIAGNOSIS — R73.03 PREDIABETES: Primary | ICD-10-CM

## 2020-11-19 LAB
ALBUMIN SERPL-MCNC: 4 G/DL (ref 3.4–5)
ALP SERPL-CCNC: 74 U/L (ref 40–150)
ALT SERPL W P-5'-P-CCNC: 44 U/L (ref 0–70)
ANION GAP SERPL CALCULATED.3IONS-SCNC: 4 MMOL/L (ref 3–14)
AST SERPL W P-5'-P-CCNC: 30 U/L (ref 0–45)
BILIRUB SERPL-MCNC: 0.8 MG/DL (ref 0.2–1.3)
BUN SERPL-MCNC: 22 MG/DL (ref 7–30)
CALCIUM SERPL-MCNC: 9 MG/DL (ref 8.5–10.1)
CHLORIDE SERPL-SCNC: 104 MMOL/L (ref 94–109)
CO2 SERPL-SCNC: 28 MMOL/L (ref 20–32)
CREAT SERPL-MCNC: 0.94 MG/DL (ref 0.66–1.25)
GFR SERPL CREATININE-BSD FRML MDRD: >90 ML/MIN/{1.73_M2}
GLUCOSE SERPL-MCNC: 102 MG/DL (ref 70–99)
HBA1C MFR BLD: 5.4 % (ref 0–5.6)
LDLC SERPL DIRECT ASSAY-MCNC: 184 MG/DL
POTASSIUM SERPL-SCNC: 4.3 MMOL/L (ref 3.4–5.3)
PROT SERPL-MCNC: 8.7 G/DL (ref 6.8–8.8)
SODIUM SERPL-SCNC: 136 MMOL/L (ref 133–144)

## 2020-11-19 PROCEDURE — 83036 HEMOGLOBIN GLYCOSYLATED A1C: CPT | Performed by: PATHOLOGY

## 2020-11-19 PROCEDURE — 97802 MEDICAL NUTRITION INDIV IN: CPT | Mod: 95

## 2020-11-19 PROCEDURE — 80053 COMPREHEN METABOLIC PANEL: CPT | Performed by: PATHOLOGY

## 2020-11-19 PROCEDURE — 99000 SPECIMEN HANDLING OFFICE-LAB: CPT | Performed by: PATHOLOGY

## 2020-11-19 PROCEDURE — 36415 COLL VENOUS BLD VENIPUNCTURE: CPT | Performed by: PATHOLOGY

## 2020-11-19 PROCEDURE — 83721 ASSAY OF BLOOD LIPOPROTEIN: CPT | Mod: 90 | Performed by: PATHOLOGY

## 2020-11-19 NOTE — PATIENT INSTRUCTIONS
[email or web address]    [email or web address]    Recommend to increase exercise and have a goal of at least 30 min per day and at least 5 days per week of moderate intensity exercise.     Reduce red meat consumption. Try to focus more on fish (cod, salmon, shrimp, tilapia, etc),  wild game, or poultry.  Could also try some other sources of protein such as beans/lentils/nuts/seeds which are all more plant based.    Increase fruits and vegetables.  Could add vegetables to eggs in the am for example.  Try to choose fruits that are rich in fiber such as apples, berries, nectarines, etc (fruits with skins that we eat).     Try to reduce alcohol or eliminate.     Can look into the Mediterranean diet which is plant based, heart healthy, and can help lower risk for developing diabetes and cancer.

## 2020-11-19 NOTE — PROGRESS NOTES
Medical Nutrition Therapy  Visit Type:Initial assessment and intervention  Did visit over video but used our phones to do audio since the computer audio on his end was not working.   Patient has given verbal consent for Video visit? Yes  Patient would like the video invitation sent by: Send to e-mail at: [email or web address]  Type of service:  Video Visit  Originating Location (pt. Location): Home  Distant Location (provider location):  Home  Mode of Communication:  Video Conference via [employer]  Video Start Time: 9:03 am  Video End Time (time video stopped): 9:38 am  Patient would like to obtain AVS? MyChart      Mian Pina presents today for MNT and education related to prediabetes.   He is accompanied by self.     ASSESSMENT:   Patient comments/concerns relating to nutrition: Has gotten sloppy with his diet in the past few years (pizza, etc). Has kids who are 17 and 15 yo. Would like to lose ~20 lbs. Is around good food a lot so eats a lot.  Would like to cut back on drinking in the next few years. Eating out is part of his work/job.     NUTRITION HISTORY:  Diet is inconsistent per pt. 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 Does not eat a lot of vegetables or fruit.   Breakfast: coffee and usually a bagel or english muffin and eggs  Lunch: trying to eat salads daily or chicken sandwich   Dinner: steaks maybe or burgers that he makes at home (often around 8 pm because of work and getting home late) or tacos   Snacks: not really a snacker  Beverages: water (reports he should drink more), coffee, alcohol  Has cut out pop and sugar in his coffee a few years ago.     Misses meals? Not usually  Eats out:  frequently     Previous diet education:  No     Food allergies/intolerances: none    Diet is high in: calories and fat (saturated)  Diet is low in: fiber, fruits and vegetables    EXERCISE: Treadmill for 15 min now each day. Has been trying to increase this  lately.     SOCIO/ECONOMIC:   Lives with: daughters    MEDICATIONS:  Current Outpatient Medications   Medication     aspirin (ASA) 81 MG EC tablet     atorvastatin (LIPITOR) 40 MG tablet     lisinopril (ZESTRIL) 10 MG tablet     No current facility-administered medications for this visit.        LABS:  Last Basic Metabolic Panel:  Lab Results   Component Value Date     03/06/2020      Lab Results   Component Value Date    POTASSIUM 4.4 03/06/2020     Lab Results   Component Value Date    CHLORIDE 105 03/06/2020     Lab Results   Component Value Date    ALESSANDRO 9.4 03/06/2020     Lab Results   Component Value Date    CO2 27 03/06/2020     Lab Results   Component Value Date    BUN 14 03/06/2020     Lab Results   Component Value Date    CR 0.89 03/06/2020     Lab Results   Component Value Date     03/06/2020       ANTHROPOMETRICS:  Vitals: There were no vitals taken for this visit.  There is no height or weight on file to calculate BMI.      Wt Readings from Last 5 Encounters:   11/12/20 101.2 kg (223 lb)   03/10/20 98.6 kg (217 lb 6.4 oz)   03/06/20 98.2 kg (216 lb 6.4 oz)       Weight Change: is up 6 lbs in the past 8 months    ESTIMATED KCAL REQUIREMENTS:  5094-1214 kcal per day for weight loss    NUTRITION DIAGNOSIS: Altered nutrition-related laboratory values related to excess alcohol, high fat foods, limited exercise as evidenced by TG's 512, chol 248, , BMI 32, diet recall.     NUTRITION INTERVENTION:  Education given to support: general nutrition guidelines, weight reduction, exercise, fiber, behavior modification, portion control and heart healthy diet  Motivational Interviewing    Educated on ways to help lower his TGs, chol, and BG today. Focused on a more plant based approach and healthier sources of meat. Encouraged more fish since he reports liking fish or poultry or wild game. Discussed ways to help increase his fruit and vegetable intake and praised him on trying to eat more salads at  lunch.  Educated on some plant based proteins too like beans/lentils/nuts/seeds.  Praised him on exercising more and encouraged him to keep this up.  For TG's, emphasized importance of reducing or stopping his alcohol intake. Also, choosing more whole grains if having carbs such as oats, whole wheat breads, etc. Answered his questions. He was wondering about keto. Discouraged keto because it is high fat and there are no restrictions on processed meats such as sausage, partida, butter, etc.  Instead, encouraged more of a mediterranean approach for both heart health and reducing risk for diabetes. He was also wondering about IF. Explained that this approach can work well for some so it is worth trying but would encourage more of a 14-16 hr fast each day.      PATIENT'S BEHAVIOR CHANGE GOALS:   See Patient Instructions for patient stated behavior change goals. AVS was printed and given to patient at today's appointment.    MONITOR / EVALUATE:  RD will monitor/evaluate:  Pertinent Labs  Progress toward meeting stated nutrition-related goals  Weight change    FOLLOW-UP:  Follow up with RD as needed.  Call RD with questions/concerns.     DIMA Quinones CDE    Time spent in minutes: 35   Encounter: Individual

## 2020-11-29 ENCOUNTER — HEALTH MAINTENANCE LETTER (OUTPATIENT)
Age: 50
End: 2020-11-29

## 2020-12-02 ENCOUNTER — HOSPITAL ENCOUNTER (OUTPATIENT)
Dept: CT IMAGING | Facility: CLINIC | Age: 50
End: 2020-12-02
Attending: NURSE PRACTITIONER
Payer: COMMERCIAL

## 2020-12-02 ENCOUNTER — HOSPITAL ENCOUNTER (OUTPATIENT)
Dept: CARDIOLOGY | Facility: CLINIC | Age: 50
End: 2020-12-02
Attending: NURSE PRACTITIONER
Payer: COMMERCIAL

## 2020-12-02 VITALS
HEART RATE: 75 BPM | SYSTOLIC BLOOD PRESSURE: 108 MMHG | OXYGEN SATURATION: 97 % | RESPIRATION RATE: 16 BRPM | DIASTOLIC BLOOD PRESSURE: 85 MMHG

## 2020-12-02 DIAGNOSIS — I10 BENIGN ESSENTIAL HYPERTENSION: ICD-10-CM

## 2020-12-02 DIAGNOSIS — R06.09 EXERTIONAL DYSPNEA: ICD-10-CM

## 2020-12-02 DIAGNOSIS — Z82.49 FAMILY HISTORY OF ISCHEMIC HEART DISEASE: ICD-10-CM

## 2020-12-02 PROCEDURE — 255N000002 HC RX 255 OP 636: Performed by: INTERNAL MEDICINE

## 2020-12-02 PROCEDURE — 250N000013 HC RX MED GY IP 250 OP 250 PS 637: Performed by: INTERNAL MEDICINE

## 2020-12-02 PROCEDURE — 93306 TTE W/DOPPLER COMPLETE: CPT | Mod: 26 | Performed by: INTERNAL MEDICINE

## 2020-12-02 PROCEDURE — 999N000208 ECHOCARDIOGRAM COMPLETE

## 2020-12-02 PROCEDURE — 250N000011 HC RX IP 250 OP 636: Performed by: NURSE PRACTITIONER

## 2020-12-02 PROCEDURE — 75574 CT ANGIO HRT W/3D IMAGE: CPT

## 2020-12-02 PROCEDURE — 75574 CT ANGIO HRT W/3D IMAGE: CPT | Mod: 26 | Performed by: INTERNAL MEDICINE

## 2020-12-02 PROCEDURE — 250N000009 HC RX 250: Performed by: INTERNAL MEDICINE

## 2020-12-02 RX ORDER — NITROGLYCERIN 0.4 MG/1
.4-.8 TABLET SUBLINGUAL
Status: DISCONTINUED | OUTPATIENT
Start: 2020-12-02 | End: 2020-12-03 | Stop reason: HOSPADM

## 2020-12-02 RX ORDER — IOPAMIDOL 755 MG/ML
120 INJECTION, SOLUTION INTRAVASCULAR ONCE
Status: COMPLETED | OUTPATIENT
Start: 2020-12-02 | End: 2020-12-02

## 2020-12-02 RX ORDER — METOPROLOL TARTRATE 1 MG/ML
5-15 INJECTION, SOLUTION INTRAVENOUS
Status: DISCONTINUED | OUTPATIENT
Start: 2020-12-02 | End: 2020-12-03 | Stop reason: HOSPADM

## 2020-12-02 RX ORDER — DIPHENHYDRAMINE HYDROCHLORIDE 50 MG/ML
25-50 INJECTION INTRAMUSCULAR; INTRAVENOUS
Status: DISCONTINUED | OUTPATIENT
Start: 2020-12-02 | End: 2020-12-03 | Stop reason: HOSPADM

## 2020-12-02 RX ORDER — DIPHENHYDRAMINE HCL 25 MG
25 CAPSULE ORAL
Status: DISCONTINUED | OUTPATIENT
Start: 2020-12-02 | End: 2020-12-03 | Stop reason: HOSPADM

## 2020-12-02 RX ORDER — ACYCLOVIR 200 MG/1
0-1 CAPSULE ORAL
Status: DISCONTINUED | OUTPATIENT
Start: 2020-12-02 | End: 2020-12-03 | Stop reason: HOSPADM

## 2020-12-02 RX ORDER — IVABRADINE 5 MG/1
5-15 TABLET, FILM COATED ORAL
Status: COMPLETED | OUTPATIENT
Start: 2020-12-02 | End: 2020-12-02

## 2020-12-02 RX ORDER — METOPROLOL TARTRATE 25 MG/1
25-100 TABLET, FILM COATED ORAL
Status: COMPLETED | OUTPATIENT
Start: 2020-12-02 | End: 2020-12-02

## 2020-12-02 RX ORDER — ONDANSETRON 2 MG/ML
4 INJECTION INTRAMUSCULAR; INTRAVENOUS
Status: DISCONTINUED | OUTPATIENT
Start: 2020-12-02 | End: 2020-12-03 | Stop reason: HOSPADM

## 2020-12-02 RX ORDER — METHYLPREDNISOLONE SODIUM SUCCINATE 125 MG/2ML
125 INJECTION, POWDER, LYOPHILIZED, FOR SOLUTION INTRAMUSCULAR; INTRAVENOUS
Status: DISCONTINUED | OUTPATIENT
Start: 2020-12-02 | End: 2020-12-03 | Stop reason: HOSPADM

## 2020-12-02 RX ADMIN — METOPROLOL TARTRATE 50 MG: 100 TABLET, FILM COATED ORAL at 12:06

## 2020-12-02 RX ADMIN — IOPAMIDOL 120 ML: 755 INJECTION, SOLUTION INTRAVENOUS at 13:03

## 2020-12-02 RX ADMIN — METOPROLOL TARTRATE 100 MG: 100 TABLET, FILM COATED ORAL at 11:15

## 2020-12-02 RX ADMIN — NITROGLYCERIN 0.8 MG: 0.4 TABLET SUBLINGUAL at 13:07

## 2020-12-02 RX ADMIN — IVABRADINE 10 MG: 5 TABLET, FILM COATED ORAL at 11:15

## 2020-12-02 RX ADMIN — HUMAN ALBUMIN MICROSPHERES AND PERFLUTREN 6 ML: 10; .22 INJECTION, SOLUTION INTRAVENOUS at 11:09

## 2020-12-02 RX ADMIN — IVABRADINE 5 MG: 5 TABLET, FILM COATED ORAL at 12:05

## 2020-12-02 RX ADMIN — METOPROLOL TARTRATE 10 MG: 5 INJECTION INTRAVENOUS at 13:08

## 2020-12-04 DIAGNOSIS — I25.10 CORONARY ARTERY DISEASE INVOLVING NATIVE CORONARY ARTERY OF NATIVE HEART WITHOUT ANGINA PECTORIS: Primary | ICD-10-CM

## 2020-12-04 RX ORDER — POTASSIUM CHLORIDE 1500 MG/1
20 TABLET, EXTENDED RELEASE ORAL
Status: CANCELLED | OUTPATIENT
Start: 2020-12-04

## 2020-12-04 RX ORDER — LIDOCAINE 40 MG/G
CREAM TOPICAL
Status: CANCELLED | OUTPATIENT
Start: 2020-12-04

## 2020-12-04 RX ORDER — POTASSIUM CHLORIDE 1500 MG/1
40 TABLET, EXTENDED RELEASE ORAL
Status: CANCELLED | OUTPATIENT
Start: 2020-12-04

## 2020-12-07 ENCOUNTER — CARE COORDINATION (OUTPATIENT)
Dept: CARDIOLOGY | Facility: CLINIC | Age: 50
End: 2020-12-07

## 2020-12-07 DIAGNOSIS — Z11.59 ENCOUNTER FOR SCREENING FOR OTHER VIRAL DISEASES: Primary | ICD-10-CM

## 2020-12-28 ENCOUNTER — TELEPHONE (OUTPATIENT)
Dept: CARDIOLOGY | Facility: CLINIC | Age: 50
End: 2020-12-28

## 2020-12-28 NOTE — TELEPHONE ENCOUNTER
[facility] Call Center    Phone Message    May a detailed message be left on voicemail: yes     Reason for Call: Other: Pt would like a call back asap as he was unable to get in for his covid test so will need to reschedule the procedure     Action Taken: Message routed to:  [facility] ([facility]): Cardio    Travel Screening: Not Applicable

## 2020-12-28 NOTE — TELEPHONE ENCOUNTER
Spoke with to let him know that his must have a covid test prior to his angiogram and that we will need to reschedule.    He knows that Michelle Alexandra CMA will be calling with the rescheduled date and time.    All questions answered and pt stated understanding.

## 2020-12-31 DIAGNOSIS — Z11.59 ENCOUNTER FOR SCREENING FOR OTHER VIRAL DISEASES: Primary | ICD-10-CM

## 2021-01-11 DIAGNOSIS — Z11.59 ENCOUNTER FOR SCREENING FOR OTHER VIRAL DISEASES: ICD-10-CM

## 2021-01-11 PROCEDURE — U0005 INFEC AGEN DETEC AMPLI PROBE: HCPCS | Performed by: INTERNAL MEDICINE

## 2021-01-11 PROCEDURE — U0003 INFECTIOUS AGENT DETECTION BY NUCLEIC ACID (DNA OR RNA); SEVERE ACUTE RESPIRATORY SYNDROME CORONAVIRUS 2 (SARS-COV-2) (CORONAVIRUS DISEASE [COVID-19]), AMPLIFIED PROBE TECHNIQUE, MAKING USE OF HIGH THROUGHPUT TECHNOLOGIES AS DESCRIBED BY CMS-2020-01-R: HCPCS | Performed by: INTERNAL MEDICINE

## 2021-01-12 LAB
SARS-COV-2 RNA RESP QL NAA+PROBE: NOT DETECTED
SPECIMEN SOURCE: NORMAL

## 2021-01-14 ENCOUNTER — TELEPHONE (OUTPATIENT)
Dept: CARDIOLOGY | Facility: CLINIC | Age: 51
End: 2021-01-14

## 2021-01-14 NOTE — TELEPHONE ENCOUNTER
Left voicemail for patient to complete Travel Screen for Cardiac Cath Lab appointment on 1/15 and inform patient of updated Visitor Policy.       covid neg

## 2021-01-15 ENCOUNTER — APPOINTMENT (OUTPATIENT)
Dept: MEDSURG UNIT | Facility: CLINIC | Age: 51
End: 2021-01-15
Attending: INTERNAL MEDICINE
Payer: COMMERCIAL

## 2021-01-15 ENCOUNTER — APPOINTMENT (OUTPATIENT)
Dept: LAB | Facility: CLINIC | Age: 51
End: 2021-01-15
Attending: INTERNAL MEDICINE
Payer: COMMERCIAL

## 2021-01-15 ENCOUNTER — HOSPITAL ENCOUNTER (OUTPATIENT)
Facility: CLINIC | Age: 51
Discharge: HOME | End: 2021-01-15
Attending: INTERNAL MEDICINE | Admitting: INTERNAL MEDICINE
Payer: COMMERCIAL

## 2021-01-15 VITALS
DIASTOLIC BLOOD PRESSURE: 92 MMHG | HEART RATE: 88 BPM | TEMPERATURE: 98.5 F | WEIGHT: 220 LBS | RESPIRATION RATE: 12 BRPM | OXYGEN SATURATION: 96 % | HEIGHT: 69 IN | SYSTOLIC BLOOD PRESSURE: 139 MMHG | BODY MASS INDEX: 32.58 KG/M2

## 2021-01-15 DIAGNOSIS — I25.10 CORONARY ARTERY DISEASE INVOLVING NATIVE CORONARY ARTERY OF NATIVE HEART WITHOUT ANGINA PECTORIS: ICD-10-CM

## 2021-01-15 DIAGNOSIS — I25.10 CORONARY ARTERY DISEASE INVOLVING NATIVE CORONARY ARTERY OF NATIVE HEART WITHOUT ANGINA PECTORIS: Primary | ICD-10-CM

## 2021-01-15 PROBLEM — Z98.890 STATUS POST CORONARY ANGIOGRAM: Status: ACTIVE | Noted: 2021-01-15

## 2021-01-15 LAB
ANION GAP SERPL CALCULATED.3IONS-SCNC: 4 MMOL/L (ref 3–14)
BUN SERPL-MCNC: 14 MG/DL (ref 7–30)
CALCIUM SERPL-MCNC: 8.9 MG/DL (ref 8.5–10.1)
CHLORIDE SERPL-SCNC: 109 MMOL/L (ref 94–109)
CO2 SERPL-SCNC: 26 MMOL/L (ref 20–32)
CREAT SERPL-MCNC: 0.88 MG/DL (ref 0.66–1.25)
ERYTHROCYTE [DISTWIDTH] IN BLOOD BY AUTOMATED COUNT: 12.7 % (ref 10–15)
GFR SERPL CREATININE-BSD FRML MDRD: >90 ML/MIN/{1.73_M2}
GLUCOSE SERPL-MCNC: 97 MG/DL (ref 70–99)
HCT VFR BLD AUTO: 46.1 % (ref 40–53)
HGB BLD-MCNC: 15.5 G/DL (ref 13.3–17.7)
MCH RBC QN AUTO: 31.1 PG (ref 26.5–33)
MCHC RBC AUTO-ENTMCNC: 33.6 G/DL (ref 31.5–36.5)
MCV RBC AUTO: 93 FL (ref 78–100)
PLATELET # BLD AUTO: 234 10E9/L (ref 150–450)
POTASSIUM SERPL-SCNC: 4.8 MMOL/L (ref 3.4–5.3)
RBC # BLD AUTO: 4.98 10E12/L (ref 4.4–5.9)
SODIUM SERPL-SCNC: 140 MMOL/L (ref 133–144)
WBC # BLD AUTO: 10 10E9/L (ref 4–11)

## 2021-01-15 PROCEDURE — 999N000054 HC STATISTIC EKG NON-CHARGEABLE

## 2021-01-15 PROCEDURE — 250N000011 HC RX IP 250 OP 636: Performed by: INTERNAL MEDICINE

## 2021-01-15 PROCEDURE — 93010 ELECTROCARDIOGRAM REPORT: CPT | Mod: 59 | Performed by: INTERNAL MEDICINE

## 2021-01-15 PROCEDURE — 85027 COMPLETE CBC AUTOMATED: CPT | Performed by: NURSE PRACTITIONER

## 2021-01-15 PROCEDURE — 80048 BASIC METABOLIC PNL TOTAL CA: CPT | Performed by: NURSE PRACTITIONER

## 2021-01-15 PROCEDURE — 99152 MOD SED SAME PHYS/QHP 5/>YRS: CPT | Performed by: INTERNAL MEDICINE

## 2021-01-15 PROCEDURE — 999N000142 HC STATISTIC PROCEDURE PREP ONLY

## 2021-01-15 PROCEDURE — 36415 COLL VENOUS BLD VENIPUNCTURE: CPT | Performed by: NURSE PRACTITIONER

## 2021-01-15 PROCEDURE — 272N000001 HC OR GENERAL SUPPLY STERILE: Performed by: INTERNAL MEDICINE

## 2021-01-15 PROCEDURE — 258N000003 HC RX IP 258 OP 636: Performed by: INTERNAL MEDICINE

## 2021-01-15 PROCEDURE — 250N000013 HC RX MED GY IP 250 OP 250 PS 637: Performed by: NURSE PRACTITIONER

## 2021-01-15 PROCEDURE — 250N000009 HC RX 250: Performed by: NURSE PRACTITIONER

## 2021-01-15 PROCEDURE — 93454 CORONARY ARTERY ANGIO S&I: CPT | Performed by: INTERNAL MEDICINE

## 2021-01-15 PROCEDURE — C1894 INTRO/SHEATH, NON-LASER: HCPCS | Performed by: INTERNAL MEDICINE

## 2021-01-15 PROCEDURE — 250N000013 HC RX MED GY IP 250 OP 250 PS 637: Performed by: INTERNAL MEDICINE

## 2021-01-15 RX ORDER — NITROGLYCERIN 20 MG/100ML
10-200 INJECTION INTRAVENOUS CONTINUOUS PRN
Status: DISCONTINUED | OUTPATIENT
Start: 2021-01-15 | End: 2021-01-15 | Stop reason: HOSPADM

## 2021-01-15 RX ORDER — POTASSIUM CHLORIDE 750 MG/1
20 TABLET, EXTENDED RELEASE ORAL
Status: DISCONTINUED | OUTPATIENT
Start: 2021-01-15 | End: 2021-01-15 | Stop reason: HOSPADM

## 2021-01-15 RX ORDER — AMLODIPINE BESYLATE 2.5 MG/1
2.5 TABLET ORAL 2 TIMES DAILY
Status: DISCONTINUED | OUTPATIENT
Start: 2021-01-15 | End: 2021-01-15 | Stop reason: HOSPADM

## 2021-01-15 RX ORDER — SODIUM CHLORIDE 9 MG/ML
INJECTION, SOLUTION INTRAVENOUS CONTINUOUS
Status: DISCONTINUED | OUTPATIENT
Start: 2021-01-15 | End: 2021-01-15 | Stop reason: HOSPADM

## 2021-01-15 RX ORDER — POTASSIUM CHLORIDE 750 MG/1
40 TABLET, EXTENDED RELEASE ORAL
Status: DISCONTINUED | OUTPATIENT
Start: 2021-01-15 | End: 2021-01-15 | Stop reason: HOSPADM

## 2021-01-15 RX ORDER — IOPAMIDOL 755 MG/ML
INJECTION, SOLUTION INTRAVASCULAR
Status: DISCONTINUED | OUTPATIENT
Start: 2021-01-15 | End: 2021-01-15 | Stop reason: HOSPADM

## 2021-01-15 RX ORDER — DOPAMINE HYDROCHLORIDE 160 MG/100ML
2-20 INJECTION, SOLUTION INTRAVENOUS CONTINUOUS PRN
Status: DISCONTINUED | OUTPATIENT
Start: 2021-01-15 | End: 2021-01-15 | Stop reason: HOSPADM

## 2021-01-15 RX ORDER — EPTIFIBATIDE 2 MG/ML
180 INJECTION, SOLUTION INTRAVENOUS EVERY 10 MIN PRN
Status: DISCONTINUED | OUTPATIENT
Start: 2021-01-15 | End: 2021-01-15 | Stop reason: HOSPADM

## 2021-01-15 RX ORDER — NALOXONE HYDROCHLORIDE 0.4 MG/ML
0.2 INJECTION, SOLUTION INTRAMUSCULAR; INTRAVENOUS; SUBCUTANEOUS
Status: DISCONTINUED | OUTPATIENT
Start: 2021-01-15 | End: 2021-01-15 | Stop reason: HOSPADM

## 2021-01-15 RX ORDER — NALOXONE HYDROCHLORIDE 0.4 MG/ML
0.4 INJECTION, SOLUTION INTRAMUSCULAR; INTRAVENOUS; SUBCUTANEOUS
Status: DISCONTINUED | OUTPATIENT
Start: 2021-01-15 | End: 2021-01-15 | Stop reason: HOSPADM

## 2021-01-15 RX ORDER — ARGATROBAN 1 MG/ML
350 INJECTION, SOLUTION INTRAVENOUS
Status: DISCONTINUED | OUTPATIENT
Start: 2021-01-15 | End: 2021-01-15 | Stop reason: HOSPADM

## 2021-01-15 RX ORDER — FENTANYL CITRATE 50 UG/ML
INJECTION, SOLUTION INTRAMUSCULAR; INTRAVENOUS
Status: DISCONTINUED | OUTPATIENT
Start: 2021-01-15 | End: 2021-01-15 | Stop reason: HOSPADM

## 2021-01-15 RX ORDER — DOBUTAMINE HYDROCHLORIDE 200 MG/100ML
2-20 INJECTION INTRAVENOUS CONTINUOUS PRN
Status: DISCONTINUED | OUTPATIENT
Start: 2021-01-15 | End: 2021-01-15 | Stop reason: HOSPADM

## 2021-01-15 RX ORDER — LIDOCAINE 40 MG/G
CREAM TOPICAL
Status: DISCONTINUED | OUTPATIENT
Start: 2021-01-15 | End: 2021-01-15 | Stop reason: HOSPADM

## 2021-01-15 RX ORDER — ARGATROBAN 1 MG/ML
150 INJECTION, SOLUTION INTRAVENOUS
Status: DISCONTINUED | OUTPATIENT
Start: 2021-01-15 | End: 2021-01-15 | Stop reason: HOSPADM

## 2021-01-15 RX ORDER — AMLODIPINE BESYLATE 2.5 MG/1
2.5 TABLET ORAL 2 TIMES DAILY
Qty: 60 TABLET | Refills: 1 | Status: SHIPPED | OUTPATIENT
Start: 2021-01-15 | End: 2021-03-16

## 2021-01-15 RX ORDER — HEPARIN SODIUM 10000 [USP'U]/100ML
100-1000 INJECTION, SOLUTION INTRAVENOUS CONTINUOUS PRN
Status: DISCONTINUED | OUTPATIENT
Start: 2021-01-15 | End: 2021-01-15 | Stop reason: HOSPADM

## 2021-01-15 RX ORDER — EPTIFIBATIDE 2 MG/ML
2 INJECTION, SOLUTION INTRAVENOUS CONTINUOUS PRN
Status: DISCONTINUED | OUTPATIENT
Start: 2021-01-15 | End: 2021-01-15 | Stop reason: HOSPADM

## 2021-01-15 RX ADMIN — SODIUM CHLORIDE: 9 INJECTION, SOLUTION INTRAVENOUS at 10:30

## 2021-01-15 RX ADMIN — ASPIRIN 325 MG ORAL TABLET 325 MG: 325 PILL ORAL at 10:29

## 2021-01-15 RX ADMIN — AMLODIPINE BESYLATE 2.5 MG: 2.5 TABLET ORAL at 14:38

## 2021-01-15 ASSESSMENT — MIFFLIN-ST. JEOR
SCORE: 1853.29
SCORE: 1853.29

## 2021-01-15 NOTE — DISCHARGE INSTRUCTIONS
Going Home after an Angioplasty or Stent Placement (Cardiac)  ______________________________________________      After you go home:    Have an adult stay with you for 24 hours.    Drink plenty of fluids.    You may eat your normal diet, unless your doctor tells you otherwise.    For 24 hours:    Relax and take it easy.    Do NOT smoke.    Do NOT make any important or legal decisions.    Do NOT drive or operate machines at home or at work.    Do NOT drink alcohol.    Remove the Band-Aid after 24 hours. If there is minor oozing, apply another Band-aid and remove it after 12 hours.    For 2 days, do NOT have sex or do any heavy exercise.    Do NOT take a bath, or use a hot tub or pool for at least 3 days. You may shower.    Care of groin site  It is normal to have a small bruise or lump at the site.    Do not scrub the site.    For the first 2 days: Do not stoop or squat. When you cough, sneeze or move your bowels, hold your hand over the puncture site and press gently.    Do not lift more than 10 pounds for at least 3 to 5 days.    Do not use lotion or powder near the puncture site for 3 days.    If you start bleeding from the site in your groin, lie down flat and press firmly  on the site. Call your doctor as soon as you can.    Care of wrist or arm site  It is normal to have soreness at the puncture site and mild tingling in your hand for up to 3 days.    For 2 days, do not use your hand or arm to support your weight (such as rising from a chair) or bend your wrist (such as lifting a garage door).    For 2 days, do not lift more than 5 pounds or exercise your arm (tennis, golf or bowling).    If you start bleeding from the site in your arm:    Sit down and press firmly on the site with your fingers for 10 minutes. Call your doctor as soon as you can.    If the bleeding stops, sit still and keep your wrist straight for 2 hours.    Medicines    If you have started taking Plavix or Effient, do not stop taking it  until you talk to your heart doctor (cardiologist).    If you are on metformin (Glucophage), do not restart it until you have blood tests (within 2 to 3 days after discharge). When your doctor tells you it is safe, you may restart the metformin.    If you have stopped any other medicines, check with your nurse or provider about when to restart them.    Call 911 right away if you have bleeding that is heavy or does not stop.    Call your doctor if:    You have a large or growing hard lump around the site.    The site is red, swollen, hot or tender.    Blood or fluid is draining from the site.    You have chills or a fever greater than 101 F (38 C).    Your leg or arm feels numb or cool.    You have hives, a rash or unusual itching.      [facility] [place]:  [phone] (7 days a week)

## 2021-01-15 NOTE — PROGRESS NOTES
1040  Prep is complete for procedure.  Wilberto is here for CORS with possible PCI.  Denies any pain now & states that he has slight SOB with activity.  Denies any chest pain.  His friend, Tristin, will pick him up when he is discharged. CTRN

## 2021-01-15 NOTE — PROGRESS NOTES
D/I/A: Pt roomed on 3C in bay 25.  Arrived via litter and accompanied by Cath Lab RN  On/Off: On monitor.  VSSA.  Rhythm upon arrival sinus rhythm on monitor.  Denies pain or sob.  Reviewed activity restrictions and when to notify RN, ie-changes to breathing or increased chest pressure or chest pain.  CCL access:  Right groin. Groin site C/D/I,  covered with Tegaderm.   P: Continue to monitor status.  Discharge to home once meeting criteria.

## 2021-01-15 NOTE — H&P
History and Physical: Cardiology Cath Lab Service    Mian Pina MRN# [id number]   YOB: 1970 Age: 50 year old         Assessment and plan:   Mian Pina is a 50 year old with family history of heart disease and recently diagnosed dyslipidemia, hypertension and prediabetes who presents for a coronary angiogram.    # Exertional dyspnea, possible anginal equivalent  # Possible severe proximal circumflex stenosis on cardiac CTA  #Hypertension  # Dyslipidemia    Plan:  - No contraindications noted, will move forward with coronary angiography  - Patient will be admitted as OP to Obs unit post procedure, with plans to discharge home after post procedure orders have been met    Patient seen and discussed with Dr. Melisa Baltazar MD  Cardiovascular disease fellow  [facility]  [phone]  01/15/2021 10:13 AM       HPI:   Mian Pina is a 50 year old with family history of heart disease and recently diagnosed dyslipidemia, hypertension and prediabetes who presents for a coronary angiogram.  The patient has been noticing progressively worsening SOB for the past years. 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  Patient reports feeling well today, denies recent illness, chest pain,  abdominal pain, headache, vision change, or weakness. No major changes in health history since previous visit.   A CTA coronaries done on 12/2/2020 showed possible severe proximal circumflex.    Past Medical History:   Diagnosis Date     Benign essential hypertension      Hyperlipidemia        History reviewed. No pertinent surgical history.    No current facility-administered medications on file prior to encounter.        aspirin (ASA) 81 MG EC tablet, Take 1 tablet (81 mg) by mouth daily       atorvastatin (LIPITOR) 40 MG tablet, Take 1 tablet (40 mg) by mouth daily       lisinopril (ZESTRIL) 10 MG tablet, Take 1 tablet (10 mg) by mouth  "daily        Family History   Problem Relation Age of Onset     Sudden Death Father      Social History     Tobacco Use     Smoking status: Never Smoker     Smokeless tobacco: Never Used   Substance Use Topics     Alcohol use: Yes       No Known Allergies      ROS:   Skin: negative  Respiratory: Shortness of breath-  Cardiovascular: negative  Gastrointestinal: negative  Genitourinary: negative  Musculoskeletal: negative  Neurologic: negative  Hematologic/Lymphatic/Immunologic: negative  Endocrine: negative    Physical Examination:  Vitals: BP (!) 144/87 (BP Location: Right arm)   Pulse 89   Resp 18   Ht 1.753 m (5' 9\")   SpO2 97%   BMI 32.93 kg/m    BMI= Body mass index is 32.93 kg/m .    GENERAL APPEARANCE: healthy, alert and no distress  HEENT: no icterus, no xanthelasmas, normal pupil size and reaction, normal palate, mucosa moist  NECK: JVP 7 cm H2O, brisk carotid upstroke bilaterally  CHEST: lungs clear to auscultation without rales, rhonchi or wheezes, no use of accessory muscles, no retractions  CARDIOVASCULAR: regular rhythm, normal S1 and S2, no S3 or S4 and no murmur, click or rub.  ABDOMEN: soft, non tender, without hepatosplenomegaly, no masses palpable, bowel sounds normal  EXTREMITIES: warm, no edema, DP/PT pulses 2+ bilaterally, no clubbing or cyanosis   NEURO: alert and oriented to person/place/time, normal speech, gait and affect  SKIN: no ecchymoses, no rashes      Laboratory:  CMP  Recent Labs   Lab 01/15/21  0938      POTASSIUM 4.8   CHLORIDE 109   CO2 26   ANIONGAP 4   GLC 97   BUN 14   CR 0.88   GFRESTIMATED >90   GFRESTBLACK >90   ALESSANDRO 8.9     CBC  Recent Labs   Lab 01/15/21  0938   WBC 10.0   RBC 4.98   HGB 15.5   HCT 46.1   MCV 93   MCH 31.1   MCHC 33.6   RDW 12.7          No results found for: TROPI, TROPONIN, TROPR, TROPN      EKG 11/12/2020: Sinus rhythm      TTE 12/2/2020:  Interpretation Summary  Global and regional left ventricular function is normal with an EF of " 60-65%.  Mild concentric wall thickening consistent with left ventricular hypertrophy  is present.  Right ventricular function, chamber size, wall motion, and thickness are  normal.  The inferior vena cava is normal.  No pericardial effusion is present.  There is no prior study for direct comparison.    CTA coronaries 12/2/2020  IMPRESSION:  1.  Cannot exclude severe proximal circumflex stenosis due to  associated motion artifact. Otherwise there is mild nonobstructive  CAD.  2.  Total Agatston score 50 placing the patient in the 82 percentile  when compared to age and gender matched control group.      I have seen and examined the patient with the [facility] team. I agree with the assessment and plan of the note above.I have reviewed pertinent labs.     Lico Vincent MD  Interventional Cardiology  Pager: [phone]

## 2021-01-16 NOTE — PRE-PROCEDURE
GENERAL PRE-PROCEDURE:     Risks and benefits: Risks, benefits and alternatives were discussed    Consent given by:  Patient  Patient states understanding of procedure being performed: Yes    Patient's understanding of procedure matches consent: Yes    Procedure consent matches procedure scheduled: Yes    Expected level of sedation:  Moderate  Appropriately NPO:  Yes  ASA Class:  Class 2- mild systemic disease, no acute problems, no functional limitations  Mallampati  :  Grade 2- soft palate, base of uvula, tonsillar pillars, and portion of posterior pharyngeal wall visible  Lungs:  Lungs clear with good breath sounds bilaterally  Heart:  Normal heart sounds and rate  History & Physical reviewed:  History and physical reviewed and no updates needed  Statement of review:  I have reviewed the lab findings, diagnostic data, medications, and the plan for sedation    Isai Baltazar MD  Cardiovascular disease fellow  [facility]  [phone]  01/15/2021 6:41 PM

## 2021-01-17 LAB — INTERPRETATION ECG - MUSE: NORMAL

## 2021-01-20 ENCOUNTER — TELEPHONE (OUTPATIENT)
Dept: CARDIOLOGY | Facility: CLINIC | Age: 51
End: 2021-01-20

## 2021-01-20 NOTE — TELEPHONE ENCOUNTER
S-(situation): coronary angiogram done 1/15/21, mild non-obstructive disease noted along with Ost Cx to Prox Cx lesion at 50% stenosis. Right femoral artery site flat and dry, restrictions reviewed.     B-(background): Mian Pina is a 50 year old with family history of heart disease and recently diagnosed dyslipidemia, hypertension and prediabetes who presents for a coronary angiogram after  CTA coronaries done on 12/2/2020 showed possible severe proximal circumflex.    A-(assessment): stable post angiography    R-(recommendations): Amlodipine 2.5 mg by mouth twice daily, follow up with NP.

## 2021-02-24 ENCOUNTER — TELEPHONE (OUTPATIENT)
Dept: CARDIOLOGY | Facility: CLINIC | Age: 51
End: 2021-02-24

## 2021-02-24 NOTE — TELEPHONE ENCOUNTER
Left VM to contact me to reschedule his  No show-appointment with Alexia and labs. Left my contact information to call me back.

## 2021-07-18 ENCOUNTER — TELEPHONE (OUTPATIENT)
Dept: CARDIOLOGY | Facility: CLINIC | Age: 51
End: 2021-07-18

## 2021-07-18 NOTE — TELEPHONE ENCOUNTER
Alexsander Stark MD new patient visit noted cancelled in work que.   Left message to return clinic call to .  Giorgi Cannon L.P.N.

## 2021-07-31 ENCOUNTER — TELEPHONE (OUTPATIENT)
Dept: CARDIOLOGY | Facility: CLINIC | Age: 51
End: 2021-07-31

## 2021-07-31 NOTE — LETTER
July 31, 2021      TO: Mian Pina  [address] [address]         Dear Mian,      Our records indicate that it is time for you to schedule your return visit with the [facility] in the CARD[facility]OVASCULAR CLINIC at the [facility] ([facility]).      To make your appointment, please call: [phone]. Monday - Friday, 8 A.M. - 4:30 P.M (Central Time Zone).    Please check with your insurance company about your benefits.  Some insurance plans provide different coverage levels for services at [facility] hospital-based clinics.     We look forward to hearing from you.

## 2021-09-25 ENCOUNTER — HEALTH MAINTENANCE LETTER (OUTPATIENT)
Age: 51
End: 2021-09-25

## 2022-01-15 ENCOUNTER — HEALTH MAINTENANCE LETTER (OUTPATIENT)
Age: 52
End: 2022-01-15

## 2022-12-26 ENCOUNTER — HEALTH MAINTENANCE LETTER (OUTPATIENT)
Age: 52
End: 2022-12-26

## 2023-04-16 ENCOUNTER — HEALTH MAINTENANCE LETTER (OUTPATIENT)
Age: 53
End: 2023-04-16

## 2024-06-23 ENCOUNTER — HEALTH MAINTENANCE LETTER (OUTPATIENT)
Age: 54
End: 2024-06-23

## (undated) DEVICE — INTRO SHEATH AVANTI 4FRX23CM 504604T

## (undated) DEVICE — KIT HAND CONTROL ACIST 014644 AR-P54

## (undated) DEVICE — TUBING PRESSURE 30"

## (undated) DEVICE — PACK HEART LEFT CUSTOM

## (undated) DEVICE — CATH ANGIO INFINITI 3DRC 4FRX100CM 538476

## (undated) DEVICE — CATH ANGIO INFINITI JL4 4FRX100CM 538420

## (undated) RX ORDER — NITROGLYCERIN 0.4 MG/1
TABLET SUBLINGUAL
Status: DISPENSED
Start: 2020-12-02

## (undated) RX ORDER — IVABRADINE 5 MG/1
TABLET, FILM COATED ORAL
Status: DISPENSED
Start: 2020-12-02

## (undated) RX ORDER — METOPROLOL TARTRATE 50 MG
TABLET ORAL
Status: DISPENSED
Start: 2020-12-02

## (undated) RX ORDER — METOPROLOL TARTRATE 1 MG/ML
INJECTION, SOLUTION INTRAVENOUS
Status: DISPENSED
Start: 2020-12-02

## (undated) RX ORDER — SODIUM CHLORIDE 9 MG/ML
INJECTION, SOLUTION INTRAVENOUS
Status: DISPENSED
Start: 2021-01-15

## (undated) RX ORDER — ASPIRIN 325 MG
TABLET ORAL
Status: DISPENSED
Start: 2021-01-15

## (undated) RX ORDER — METOPROLOL TARTRATE 100 MG
TABLET ORAL
Status: DISPENSED
Start: 2020-12-02